# Patient Record
Sex: MALE | Race: WHITE | NOT HISPANIC OR LATINO | Employment: FULL TIME | URBAN - METROPOLITAN AREA
[De-identification: names, ages, dates, MRNs, and addresses within clinical notes are randomized per-mention and may not be internally consistent; named-entity substitution may affect disease eponyms.]

---

## 2020-12-23 ENCOUNTER — LAB (OUTPATIENT)
Dept: LAB | Facility: HOSPITAL | Age: 50
End: 2020-12-23

## 2020-12-23 ENCOUNTER — OFFICE VISIT (OUTPATIENT)
Dept: ENDOCRINOLOGY | Facility: CLINIC | Age: 50
End: 2020-12-23

## 2020-12-23 VITALS
TEMPERATURE: 97.7 F | WEIGHT: 180.6 LBS | OXYGEN SATURATION: 98 % | BODY MASS INDEX: 24.46 KG/M2 | DIASTOLIC BLOOD PRESSURE: 80 MMHG | SYSTOLIC BLOOD PRESSURE: 118 MMHG | HEART RATE: 61 BPM | HEIGHT: 72 IN

## 2020-12-23 DIAGNOSIS — E03.9 ACQUIRED HYPOTHYROIDISM: ICD-10-CM

## 2020-12-23 DIAGNOSIS — I10 BENIGN HYPERTENSION: ICD-10-CM

## 2020-12-23 DIAGNOSIS — E10.9 TYPE 1 DIABETES MELLITUS WITHOUT COMPLICATION (HCC): Primary | ICD-10-CM

## 2020-12-23 LAB
EXPIRATION DATE: NORMAL
HBA1C MFR BLD: 6 %
Lab: NORMAL
TSH SERPL DL<=0.05 MIU/L-ACNC: 10.7 UIU/ML (ref 0.27–4.2)

## 2020-12-23 PROCEDURE — 84443 ASSAY THYROID STIM HORMONE: CPT

## 2020-12-23 PROCEDURE — 99214 OFFICE O/P EST MOD 30 MIN: CPT | Performed by: INTERNAL MEDICINE

## 2020-12-23 PROCEDURE — 83036 HEMOGLOBIN GLYCOSYLATED A1C: CPT | Performed by: INTERNAL MEDICINE

## 2020-12-23 RX ORDER — ASPIRIN 81 MG/1
81 TABLET ORAL DAILY
COMMUNITY

## 2020-12-23 RX ORDER — LEVOTHYROXINE SODIUM 0.12 MG/1
125 TABLET ORAL DAILY
Qty: 90 TABLET | Refills: 3 | Status: SHIPPED | OUTPATIENT
Start: 2020-12-23 | End: 2020-12-24 | Stop reason: DRUGHIGH

## 2020-12-23 RX ORDER — INSULIN DEGLUDEC INJECTION 100 U/ML
13 INJECTION, SOLUTION SUBCUTANEOUS DAILY
Qty: 15 ML | Refills: 3 | Status: SHIPPED | OUTPATIENT
Start: 2020-12-23 | End: 2021-03-08

## 2020-12-23 RX ORDER — LEVOTHYROXINE SODIUM 0.12 MG/1
125 TABLET ORAL DAILY
COMMUNITY
End: 2020-12-23 | Stop reason: SDUPTHER

## 2020-12-23 RX ORDER — LISINOPRIL 10 MG/1
10 TABLET ORAL DAILY
COMMUNITY
End: 2020-12-23 | Stop reason: SDUPTHER

## 2020-12-23 RX ORDER — LISINOPRIL 10 MG/1
10 TABLET ORAL DAILY
Qty: 90 TABLET | Refills: 3 | Status: SHIPPED | OUTPATIENT
Start: 2020-12-23 | End: 2022-01-31 | Stop reason: SDUPTHER

## 2020-12-23 NOTE — PROGRESS NOTES
"     Office Note      Date: 2020  Patient Name: Nick Munguia  MRN: [id number]  : 1970    Chief Complaint   Patient presents with   • Diabetes       History of Present Illness:   Nick Munguia is a 50 y.o. male who presents for Diabetes type 1. Diagnosed in: . Treated in past with insulin. Current treatments: basal bolus insulin. Number of insulin shots per day: 4. Checks blood sugar 4 times a day. Has low blood sugar: rare. Aspirin use: Yes. Statin use: No -  . ACE-I/ARB use: Yes. Changes in health since last visit: none. Last eye exam 2019.    He remains on T4 125mcg qd. He is taking this correctly. He isn't taking any interfering  meds concurrently. He hasn't noted any change in the size of his neck. He denies any  compressive sxs. He denies any sxs of hypo- or hyperthyroidism at this time.    Subjective      Diabetic Complications:  Eyes: No  Kidneys: No  Feet: No  Heart: No    Diet and Exercise:  Meals per day: 3  Minutes of exercise per week: 200 mins.    Review of Systems:   Review of Systems   Constitutional: Negative.    Cardiovascular: Negative.    Gastrointestinal: Negative.    Endocrine: Negative.        The following portions of the patient's history were reviewed and updated as appropriate: allergies, current medications, past family history, past medical history, past social history, past surgical history and problem list.    Objective       Visit Vitals  /80   Pulse 61   Temp 97.7 °F (36.5 °C) (Infrared)   Ht 182.9 cm (72\")   Wt 81.9 kg (180 lb 9.6 oz)   SpO2 98%   BMI 24.49 kg/m²       Physical Exam:  Physical Exam  Constitutional:       Appearance: Normal appearance.   Neurological:      Mental Status: He is alert.         Labs:    HbA1c  Lab Results   Component Value Date    HGBA1C 6.0 2020       CMP  No results found for: GLUCOSE, BUN, CREATININE, EGFRIFNONA, EGFRIFAFRI, BCR, K, CO2, CALCIUM, PROTENTOTREF, LABIL2, BILIRUBIN, AST, ALT     Lipid Panel        TSH  No " results found for: TSH, FREET4     Hemoglobin A1C  Lab Results   Component Value Date    HGBA1C 6.0 12/23/2020        Microalbumin/Creatinine  No results found for: MALBCRERATIO, CREATINIURIN, MICROALBUR        Assessment / Plan      Assessment & Plan:  Problem List Items Addressed This Visit        Cardiovascular and Mediastinum    Benign hypertension    Relevant Medications    lisinopril (PRINIVIL,ZESTRIL) 10 MG tablet       Endocrine    Type 1 diabetes mellitus without complication (CMS/formerly [facility]) - Primary    Relevant Medications    insulin aspart (novoLOG FLEXPEN) 100 UNIT/ML solution pen-injector sc pen    insulin degludec (Tresiba FlexTouch) 100 UNIT/ML solution pen-injector injection    Other Relevant Orders    POC Glycosylated Hemoglobin (Hb A1C) (Completed)    Acquired hypothyroidism    Relevant Medications    levothyroxine (SYNTHROID, LEVOTHROID) 125 MCG tablet    Other Relevant Orders    TSH           Return in about 3 months (around 3/23/2021) for Recheck with A1c, CMP, lipids, TSH, microalbumin.    Albert Bell MD   12/23/2020

## 2020-12-24 RX ORDER — LEVOTHYROXINE SODIUM 0.15 MG/1
150 TABLET ORAL DAILY
Qty: 90 TABLET | Refills: 3 | Status: SHIPPED | OUTPATIENT
Start: 2020-12-24 | End: 2022-01-31 | Stop reason: SDUPTHER

## 2021-03-08 RX ORDER — INSULIN GLARGINE 100 [IU]/ML
13 INJECTION, SOLUTION SUBCUTANEOUS DAILY
Qty: 15 ML | Refills: 0 | Status: SHIPPED | OUTPATIENT
Start: 2021-03-08 | End: 2022-01-31 | Stop reason: SDUPTHER

## 2021-04-13 ENCOUNTER — LAB (OUTPATIENT)
Dept: LAB | Facility: HOSPITAL | Age: 51
End: 2021-04-13

## 2021-04-13 ENCOUNTER — OFFICE VISIT (OUTPATIENT)
Dept: ENDOCRINOLOGY | Facility: CLINIC | Age: 51
End: 2021-04-13

## 2021-04-13 VITALS
DIASTOLIC BLOOD PRESSURE: 62 MMHG | SYSTOLIC BLOOD PRESSURE: 102 MMHG | BODY MASS INDEX: 23.98 KG/M2 | OXYGEN SATURATION: 99 % | WEIGHT: 177 LBS | HEART RATE: 74 BPM | HEIGHT: 72 IN | TEMPERATURE: 97.3 F

## 2021-04-13 DIAGNOSIS — E10.9 TYPE 1 DIABETES MELLITUS WITHOUT COMPLICATION (HCC): Primary | ICD-10-CM

## 2021-04-13 DIAGNOSIS — E03.9 ACQUIRED HYPOTHYROIDISM: ICD-10-CM

## 2021-04-13 DIAGNOSIS — E10.9 TYPE 1 DIABETES MELLITUS WITHOUT COMPLICATION (HCC): ICD-10-CM

## 2021-04-13 DIAGNOSIS — I10 BENIGN HYPERTENSION: ICD-10-CM

## 2021-04-13 LAB
ALBUMIN SERPL-MCNC: 4.2 G/DL (ref 3.5–5.2)
ALBUMIN UR-MCNC: <1.2 MG/DL
ALBUMIN/GLOB SERPL: 1.4 G/DL
ALP SERPL-CCNC: 68 U/L (ref 39–117)
ALT SERPL W P-5'-P-CCNC: 16 U/L (ref 1–41)
ANION GAP SERPL CALCULATED.3IONS-SCNC: 8.1 MMOL/L (ref 5–15)
AST SERPL-CCNC: 21 U/L (ref 1–40)
BILIRUB SERPL-MCNC: 0.6 MG/DL (ref 0–1.2)
BUN SERPL-MCNC: 15 MG/DL (ref 6–20)
BUN/CREAT SERPL: 14.7 (ref 7–25)
CALCIUM SPEC-SCNC: 9.3 MG/DL (ref 8.6–10.5)
CHLORIDE SERPL-SCNC: 100 MMOL/L (ref 98–107)
CHOLEST SERPL-MCNC: 117 MG/DL (ref 0–200)
CO2 SERPL-SCNC: 29.9 MMOL/L (ref 22–29)
CREAT SERPL-MCNC: 1.02 MG/DL (ref 0.76–1.27)
CREAT UR-MCNC: 104.2 MG/DL
EXPIRATION DATE: NORMAL
GFR SERPL CREATININE-BSD FRML MDRD: 78 ML/MIN/1.73
GLOBULIN UR ELPH-MCNC: 3.1 GM/DL
GLUCOSE SERPL-MCNC: 84 MG/DL (ref 65–99)
HBA1C MFR BLD: 5.9 %
HDLC SERPL-MCNC: 53 MG/DL (ref 40–60)
LDLC SERPL CALC-MCNC: 53 MG/DL (ref 0–100)
LDLC/HDLC SERPL: 1.03 {RATIO}
Lab: NORMAL
MICROALBUMIN/CREAT UR: NORMAL MG/G{CREAT}
POTASSIUM SERPL-SCNC: 4.3 MMOL/L (ref 3.5–5.2)
PROT SERPL-MCNC: 7.3 G/DL (ref 6–8.5)
SODIUM SERPL-SCNC: 138 MMOL/L (ref 136–145)
TRIGL SERPL-MCNC: 46 MG/DL (ref 0–150)
TSH SERPL DL<=0.05 MIU/L-ACNC: 1.23 UIU/ML (ref 0.27–4.2)
VLDLC SERPL-MCNC: 11 MG/DL (ref 5–40)

## 2021-04-13 PROCEDURE — 83036 HEMOGLOBIN GLYCOSYLATED A1C: CPT | Performed by: INTERNAL MEDICINE

## 2021-04-13 PROCEDURE — 84443 ASSAY THYROID STIM HORMONE: CPT

## 2021-04-13 PROCEDURE — 80053 COMPREHEN METABOLIC PANEL: CPT

## 2021-04-13 PROCEDURE — 80061 LIPID PANEL: CPT

## 2021-04-13 PROCEDURE — 82570 ASSAY OF URINE CREATININE: CPT

## 2021-04-13 PROCEDURE — 82043 UR ALBUMIN QUANTITATIVE: CPT

## 2021-04-13 PROCEDURE — 99214 OFFICE O/P EST MOD 30 MIN: CPT | Performed by: INTERNAL MEDICINE

## 2021-04-13 RX ORDER — BLOOD SUGAR DIAGNOSTIC
STRIP MISCELLANEOUS
COMMUNITY
Start: 2021-03-06 | End: 2021-11-22 | Stop reason: SDUPTHER

## 2021-04-13 RX ORDER — PEN NEEDLE, DIABETIC 31 GX5/16"
NEEDLE, DISPOSABLE MISCELLANEOUS
COMMUNITY
Start: 2021-03-06 | End: 2022-01-31 | Stop reason: SDUPTHER

## 2021-04-13 NOTE — PROGRESS NOTES
"     Office Note      Date: 2021  Patient Name: Nick Munguia  MRN: [id number]  : 1970    Chief Complaint   Patient presents with   • Diabetes       History of Present Illness:   Nick Munguia is a 50 y.o. male who presents for Diabetes type 1. Diagnosed in: . Treated in past with insulin. Current treatments: basal bolus insulin. Number of insulin shots per day: 4. Checks blood sugar 4 times a day. Has low blood sugar: rare. Aspirin use: Yes. Statin use: No -  . ACE-I/ARB use: Yes. Changes in health since last visit: none. Last eye exam 2019.     He remains on T4 150mcg qd. He is taking this correctly. He isn't taking any interfering meds concurrently. He hasn't noted any change in the size of his neck. He denies any compressive sxs. He denies any sxs of hypo- or hyperthyroidism at this time.    He had both COVID-19 vaccinations.  Had a reaction after 2nd shot.    Subjective      Diabetic Complications:  Eyes: No  Kidneys: No  Feet: No  Heart: No    Diet and Exercise:  Meals per day: 3  Minutes of exercise per week: 200 mins.    Review of Systems:   Review of Systems   Constitutional: Negative.    Cardiovascular: Negative.    Gastrointestinal: Negative.    Endocrine: Negative.        The following portions of the patient's history were reviewed and updated as appropriate: allergies, current medications, past family history, past medical history, past social history, past surgical history and problem list.    Objective       Visit Vitals  /62   Pulse 74   Temp 97.3 °F (36.3 °C) (Infrared)   Ht 182.9 cm (72\")   Wt 80.3 kg (177 lb)   SpO2 99%   BMI 24.01 kg/m²       Physical Exam:  Physical Exam  Constitutional:       Appearance: Normal appearance.   Neck:      Thyroid: No thyroid mass, thyromegaly or thyroid tenderness.   Cardiovascular:      Pulses:           Dorsalis pedis pulses are 2+ on the right side and 2+ on the left side.        Posterior tibial pulses are 2+ on the right side and 2+ on " the left side.   Feet:      Right foot:      Protective Sensation: 5 sites tested. 5 sites sensed.      Skin integrity: Skin integrity normal.      Toenail Condition: Right toenails are abnormally thick.      Left foot:      Protective Sensation: 5 sites tested. 5 sites sensed.      Skin integrity: Callus present.      Toenail Condition: Left toenails are abnormally thick.      Comments: Callus on medial aspect of 1st toe  Lymphadenopathy:      Cervical: No cervical adenopathy.   Neurological:      Mental Status: He is alert.         Labs:    HbA1c  Lab Results   Component Value Date    HGBA1C 5.9 04/13/2021       CMP  No results found for: GLUCOSE, BUN, CREATININE, EGFRIFNONA, EGFRIFAFRI, BCR, K, CO2, CALCIUM, PROTENTOTREF, LABIL2, BILIRUBIN, AST, ALT     Lipid Panel        TSH  Lab Results   Component Value Date    TSH 10.700 (H) 12/23/2020        Hemoglobin A1C  Lab Results   Component Value Date    HGBA1C 5.9 04/13/2021        Microalbumin/Creatinine  No results found for: MALBCRERATIO, CREATINIURIN, MICROALBUR        Assessment / Plan      Assessment & Plan:  Diagnoses and all orders for this visit:    1. Type 1 diabetes mellitus without complication (CMS/HCC) (Primary)  Assessment & Plan:  Diabetes is unchanged.   Continue current treatment regimen.  Diabetes will be reassessed in 3 months.    Orders:  -     POC Glycosylated Hemoglobin (Hb A1C)  -     Comprehensive Metabolic Panel; Future  -     Lipid Panel; Future  -     Microalbumin / Creatinine Urine Ratio - Urine, Clean Catch; Future  -     TSH; Future    2. Benign hypertension  Assessment & Plan:  Hypertension is unchanged.  Continue current treatment regimen.  Blood pressure will be reassessed at the next regular appointment.      3. Acquired hypothyroidism  Assessment & Plan:  Continue T4.  Check TSH today.        Return in about 3 months (around 7/13/2021) for Recheck with A1c.    Albert Bell MD   04/13/2021

## 2021-08-17 ENCOUNTER — OFFICE VISIT (OUTPATIENT)
Dept: ENDOCRINOLOGY | Facility: CLINIC | Age: 51
End: 2021-08-17

## 2021-08-17 VITALS
DIASTOLIC BLOOD PRESSURE: 68 MMHG | WEIGHT: 168 LBS | SYSTOLIC BLOOD PRESSURE: 110 MMHG | HEART RATE: 56 BPM | OXYGEN SATURATION: 97 % | BODY MASS INDEX: 22.75 KG/M2 | HEIGHT: 72 IN

## 2021-08-17 DIAGNOSIS — E10.9 TYPE 1 DIABETES MELLITUS WITHOUT COMPLICATION (HCC): Primary | ICD-10-CM

## 2021-08-17 DIAGNOSIS — I10 BENIGN HYPERTENSION: ICD-10-CM

## 2021-08-17 DIAGNOSIS — E03.9 ACQUIRED HYPOTHYROIDISM: ICD-10-CM

## 2021-08-17 LAB
EXPIRATION DATE: NORMAL
EXPIRATION DATE: NORMAL
GLUCOSE BLDC GLUCOMTR-MCNC: 79 MG/DL (ref 70–130)
HBA1C MFR BLD: 5.7 %
Lab: NORMAL
Lab: NORMAL

## 2021-08-17 PROCEDURE — 83036 HEMOGLOBIN GLYCOSYLATED A1C: CPT | Performed by: INTERNAL MEDICINE

## 2021-08-17 PROCEDURE — 99214 OFFICE O/P EST MOD 30 MIN: CPT | Performed by: INTERNAL MEDICINE

## 2021-08-17 PROCEDURE — 82947 ASSAY GLUCOSE BLOOD QUANT: CPT | Performed by: INTERNAL MEDICINE

## 2021-08-17 NOTE — PROGRESS NOTES
"     Office Note      Date: 2021  Patient Name: Nick Munguia  MRN: [id number]  : 1970    Chief Complaint   Patient presents with   • Diabetes       History of Present Illness:   Nick Munguia is a 50 y.o. male who presents for Diabetes type 1. Diagnosed in: . Treated in past with insulin. Current treatments: basal bolus insulin. Number of insulin shots per day: 4. Checks blood sugar 4 times a day. Has low blood sugar: rare. Aspirin use: Yes. Statin use: No -  . ACE-I/ARB use: Yes. Changes in health since last visit: none. Last eye exam 2019.     He remains on T4 150mcg qd. He is taking this correctly. He isn't taking any interfering meds concurrently. He hasn't noted any change in the size of his neck. He denies any compressive sxs. He denies any sxs of hypo- or hyperthyroidism at this time.    Subjective      Diabetic Complications:  Eyes: No  Kidneys: No  Feet: No  Heart: No    Diet and Exercise:  Meals per day: 3  Minutes of exercise per week: 200 mins.    Review of Systems:   Review of Systems   Constitutional: Negative.    Cardiovascular: Negative.    Gastrointestinal: Negative.    Endocrine: Negative.        The following portions of the patient's history were reviewed and updated as appropriate: allergies, current medications, past family history, past medical history, past social history, past surgical history and problem list.    Objective       Visit Vitals  /68 (BP Location: Left arm, Patient Position: Sitting, Cuff Size: Adult)   Pulse 56   Ht 182.9 cm (72\")   Wt 76.2 kg (168 lb)   SpO2 97%   BMI 22.78 kg/m²       Physical Exam:  Physical Exam  Constitutional:       Appearance: Normal appearance.   Neurological:      Mental Status: He is alert.         Labs:    HbA1c  Lab Results   Component Value Date    HGBA1C 5.7 2021       CMP  Lab Results   Component Value Date    GLUCOSE 84 2021    BUN 15 2021    CREATININE 1.02 2021    EGFRIFNONA 78 2021    BCR " 14.7 04/13/2021    K 4.3 04/13/2021    CO2 29.9 (H) 04/13/2021    CALCIUM 9.3 04/13/2021    AST 21 04/13/2021    ALT 16 04/13/2021        Lipid Panel  Lab Results   Component Value Date    HDL 53 04/13/2021    LDL 53 04/13/2021    TRIG 46 04/13/2021        TSH  Lab Results   Component Value Date    TSH 1.230 04/13/2021        Hemoglobin A1C  Lab Results   Component Value Date    HGBA1C 5.7 08/17/2021        Microalbumin/Creatinine  Lab Results   Component Value Date    MALBCRERATIO  04/13/2021      Comment:      Unable to calculate    MICROALBUR <1.2 04/13/2021           Assessment / Plan      Assessment & Plan:  Diagnoses and all orders for this visit:    1. Type 1 diabetes mellitus without complication (CMS/HCC) (Primary)  Assessment & Plan:  Diabetes is unchanged.   Continue current treatment regimen.  Diabetes will be reassessed in 3 months.    Orders:  -     POC Glycosylated Hemoglobin (Hb A1C)  -     POC Glucose, Blood    2. Benign hypertension  Assessment & Plan:  Hypertension is unchanged.  Continue current treatment regimen.  Blood pressure will be reassessed at the next regular appointment.      3. Acquired hypothyroidism  Assessment & Plan:  TSH at goal last visit.  Continue current T4 dose.  Plan to check TSH next visit.        Return in about 3 months (around 11/17/2021) for Recheck with A1c, TSH.    Albert Bell MD   08/17/2021

## 2021-11-22 RX ORDER — BLOOD SUGAR DIAGNOSTIC
STRIP MISCELLANEOUS
Qty: 150 EACH | Refills: 5 | Status: SHIPPED | OUTPATIENT
Start: 2021-11-22 | End: 2022-06-01 | Stop reason: SDUPTHER

## 2021-12-01 ENCOUNTER — LAB (OUTPATIENT)
Dept: LAB | Facility: HOSPITAL | Age: 51
End: 2021-12-01

## 2021-12-01 ENCOUNTER — OFFICE VISIT (OUTPATIENT)
Dept: ENDOCRINOLOGY | Facility: CLINIC | Age: 51
End: 2021-12-01

## 2021-12-01 VITALS
DIASTOLIC BLOOD PRESSURE: 59 MMHG | BODY MASS INDEX: 22.89 KG/M2 | WEIGHT: 169 LBS | HEART RATE: 70 BPM | OXYGEN SATURATION: 97 % | HEIGHT: 72 IN | SYSTOLIC BLOOD PRESSURE: 102 MMHG

## 2021-12-01 DIAGNOSIS — E03.9 ACQUIRED HYPOTHYROIDISM: ICD-10-CM

## 2021-12-01 DIAGNOSIS — E10.9 TYPE 1 DIABETES MELLITUS WITHOUT COMPLICATION (HCC): Primary | ICD-10-CM

## 2021-12-01 DIAGNOSIS — I10 BENIGN HYPERTENSION: ICD-10-CM

## 2021-12-01 LAB
EXPIRATION DATE: NORMAL
EXPIRATION DATE: NORMAL
GLUCOSE BLDC GLUCOMTR-MCNC: 81 MG/DL (ref 70–130)
HBA1C MFR BLD: 7 %
Lab: NORMAL
Lab: NORMAL
TSH SERPL DL<=0.05 MIU/L-ACNC: 2.62 UIU/ML (ref 0.27–4.2)

## 2021-12-01 PROCEDURE — 2033F EYE IMG VALID W/O RTNOPTHY: CPT | Performed by: INTERNAL MEDICINE

## 2021-12-01 PROCEDURE — 84443 ASSAY THYROID STIM HORMONE: CPT

## 2021-12-01 PROCEDURE — 92250 FUNDUS PHOTOGRAPHY W/I&R: CPT | Performed by: INTERNAL MEDICINE

## 2021-12-01 PROCEDURE — 83036 HEMOGLOBIN GLYCOSYLATED A1C: CPT | Performed by: INTERNAL MEDICINE

## 2021-12-01 PROCEDURE — 82947 ASSAY GLUCOSE BLOOD QUANT: CPT | Performed by: INTERNAL MEDICINE

## 2021-12-01 PROCEDURE — 99214 OFFICE O/P EST MOD 30 MIN: CPT | Performed by: INTERNAL MEDICINE

## 2021-12-01 NOTE — PROGRESS NOTES
"     Office Note      Date: 2021  Patient Name: Nick Munguia  MRN: [id number]  : 1970    Chief Complaint   Patient presents with   • Diabetes     type 1       History of Present Illness:   Nick Munguia is a 51 y.o. male who presents for Diabetes type 1. Diagnosed in: . Treated in past with insulin. Current treatments: basal bolus insulin. Number of insulin shots per day: 4. Checks blood sugar 4 times a day. Has low blood sugar: rare. Aspirin use: Yes. Statin use: No -  . ACE-I/ARB use: Yes. Changes in health since last visit: COVID booster injection - had higher FSBS since then. Last eye exam .     He remains on T4 150mcg qd. He is taking this correctly. He isn't taking any interfering meds concurrently. He hasn't noted any change in the size of his neck. He denies any compressive sxs. He denies any sxs of hypo- or hyperthyroidism at this time.    Subjective      Diabetic Complications:  Eyes: No  Kidneys: No  Feet: No  Heart: No    Diet and Exercise:  Meals per day: 3  Minutes of exercise per week: 200 mins.    Review of Systems:   Review of Systems   Constitutional: Negative.    Cardiovascular: Negative.    Gastrointestinal: Negative.    Endocrine: Negative.        The following portions of the patient's history were reviewed and updated as appropriate: allergies, current medications, past family history, past medical history, past social history, past surgical history and problem list.    Objective       Visit Vitals  /59   Pulse 70   Ht 182.9 cm (72\")   Wt 76.7 kg (169 lb)   SpO2 97%   BMI 22.92 kg/m²       Physical Exam:  Physical Exam  Constitutional:       Appearance: Normal appearance.   Neurological:      Mental Status: He is alert.         Labs:    HbA1c  Lab Results   Component Value Date    HGBA1C 7.0 2021       CMP  Lab Results   Component Value Date    GLUCOSE 84 2021    BUN 15 2021    CREATININE 1.02 2021    EGFRIFNONA 78 2021    BCR 14.7 " 04/13/2021    K 4.3 04/13/2021    CO2 29.9 (H) 04/13/2021    CALCIUM 9.3 04/13/2021    AST 21 04/13/2021    ALT 16 04/13/2021        Lipid Panel  Lab Results   Component Value Date    HDL 53 04/13/2021    LDL 53 04/13/2021    TRIG 46 04/13/2021        TSH  Lab Results   Component Value Date    TSH 2.620 12/01/2021        Hemoglobin A1C  Lab Results   Component Value Date    HGBA1C 7.0 12/01/2021        Microalbumin/Creatinine  Lab Results   Component Value Date    MALBCRERATIO  04/13/2021      Comment:      Unable to calculate    MICROALBUR <1.2 04/13/2021           Assessment / Plan      Assessment & Plan:  Diagnoses and all orders for this visit:    1. Type 1 diabetes mellitus without complication (HCC) (Primary)  Assessment & Plan:  Diabetes is worsening.  A1c increased but still acceptable. He has noted higher glucose since COVID booster.  Continue current treatment regimen.  But titrate up insulin.  Diabetes will be reassessed in 3 months.    Retinal photos were performed today.  This revealed no retinopathy in the left eye.  Pt is monocular so no image for the right eye.    Orders:  -     POC Glycosylated Hemoglobin (Hb A1C)  -     POC Glucose, Blood    2. Benign hypertension  Assessment & Plan:  Hypertension is unchanged.  Continue current treatment regimen.  Blood pressure will be reassessed at the next regular appointment.      3. Acquired hypothyroidism  Assessment & Plan:  Continue T4.  Check TSH today.    Orders:  -     TSH; Future      Return in about 3 months (around 3/1/2022) for Recheck with A1c, CMP, lipids, TSH, microalbumin, foot exam.    Albert Bell MD   12/01/2021

## 2021-12-07 ENCOUNTER — TELEPHONE (OUTPATIENT)
Dept: ENDOCRINOLOGY | Facility: CLINIC | Age: 51
End: 2021-12-07

## 2022-01-31 RX ORDER — PEN NEEDLE, DIABETIC 31 GX5/16"
NEEDLE, DISPOSABLE MISCELLANEOUS
Qty: 150 EACH | Refills: 5 | Status: SHIPPED | OUTPATIENT
Start: 2022-01-31

## 2022-01-31 RX ORDER — LISINOPRIL 10 MG/1
10 TABLET ORAL DAILY
Qty: 90 TABLET | Refills: 1 | Status: SHIPPED | OUTPATIENT
Start: 2022-01-31 | End: 2022-08-05 | Stop reason: SDUPTHER

## 2022-01-31 RX ORDER — INSULIN GLARGINE 100 [IU]/ML
13 INJECTION, SOLUTION SUBCUTANEOUS DAILY
Qty: 15 ML | Refills: 1 | Status: SHIPPED | OUTPATIENT
Start: 2022-01-31 | End: 2022-08-05 | Stop reason: SDUPTHER

## 2022-01-31 RX ORDER — LEVOTHYROXINE SODIUM 0.15 MG/1
150 TABLET ORAL DAILY
Qty: 90 TABLET | Refills: 1 | Status: SHIPPED | OUTPATIENT
Start: 2022-01-31 | End: 2022-08-05 | Stop reason: SDUPTHER

## 2022-03-07 ENCOUNTER — LAB (OUTPATIENT)
Dept: LAB | Facility: HOSPITAL | Age: 52
End: 2022-03-07

## 2022-03-07 ENCOUNTER — OFFICE VISIT (OUTPATIENT)
Dept: ENDOCRINOLOGY | Facility: CLINIC | Age: 52
End: 2022-03-07

## 2022-03-07 VITALS
OXYGEN SATURATION: 97 % | SYSTOLIC BLOOD PRESSURE: 106 MMHG | HEART RATE: 70 BPM | WEIGHT: 175 LBS | BODY MASS INDEX: 23.7 KG/M2 | DIASTOLIC BLOOD PRESSURE: 60 MMHG | HEIGHT: 72 IN

## 2022-03-07 DIAGNOSIS — E10.9 TYPE 1 DIABETES MELLITUS WITHOUT COMPLICATION: Primary | ICD-10-CM

## 2022-03-07 DIAGNOSIS — E10.9 TYPE 1 DIABETES MELLITUS WITHOUT COMPLICATION: ICD-10-CM

## 2022-03-07 DIAGNOSIS — I10 BENIGN HYPERTENSION: ICD-10-CM

## 2022-03-07 DIAGNOSIS — E03.9 ACQUIRED HYPOTHYROIDISM: ICD-10-CM

## 2022-03-07 LAB
ALBUMIN SERPL-MCNC: 4.3 G/DL (ref 3.5–5.2)
ALBUMIN UR-MCNC: <1.2 MG/DL
ALBUMIN/GLOB SERPL: 1.8 G/DL
ALP SERPL-CCNC: 71 U/L (ref 39–117)
ALT SERPL W P-5'-P-CCNC: 16 U/L (ref 1–41)
ANION GAP SERPL CALCULATED.3IONS-SCNC: 6.6 MMOL/L (ref 5–15)
AST SERPL-CCNC: 17 U/L (ref 1–40)
BILIRUB SERPL-MCNC: 0.5 MG/DL (ref 0–1.2)
BUN SERPL-MCNC: 14 MG/DL (ref 6–20)
BUN/CREAT SERPL: 13.9 (ref 7–25)
CALCIUM SPEC-SCNC: 9.2 MG/DL (ref 8.6–10.5)
CHLORIDE SERPL-SCNC: 103 MMOL/L (ref 98–107)
CHOLEST SERPL-MCNC: 120 MG/DL (ref 0–200)
CO2 SERPL-SCNC: 29.4 MMOL/L (ref 22–29)
CREAT SERPL-MCNC: 1.01 MG/DL (ref 0.76–1.27)
CREAT UR-MCNC: 118.6 MG/DL
EGFRCR SERPLBLD CKD-EPI 2021: 90.6 ML/MIN/1.73
EXPIRATION DATE: ABNORMAL
EXPIRATION DATE: NORMAL
GLOBULIN UR ELPH-MCNC: 2.4 GM/DL
GLUCOSE BLDC GLUCOMTR-MCNC: 160 MG/DL (ref 70–130)
GLUCOSE SERPL-MCNC: 142 MG/DL (ref 65–99)
HBA1C MFR BLD: 6.4 %
HDLC SERPL-MCNC: 50 MG/DL (ref 40–60)
LDLC SERPL CALC-MCNC: 61 MG/DL (ref 0–100)
LDLC/HDLC SERPL: 1.27 {RATIO}
Lab: ABNORMAL
Lab: NORMAL
MICROALBUMIN/CREAT UR: NORMAL MG/G{CREAT}
POTASSIUM SERPL-SCNC: 4.4 MMOL/L (ref 3.5–5.2)
PROT SERPL-MCNC: 6.7 G/DL (ref 6–8.5)
SODIUM SERPL-SCNC: 139 MMOL/L (ref 136–145)
TRIGL SERPL-MCNC: 32 MG/DL (ref 0–150)
TSH SERPL DL<=0.05 MIU/L-ACNC: 0.72 UIU/ML (ref 0.27–4.2)
VLDLC SERPL-MCNC: 9 MG/DL (ref 5–40)

## 2022-03-07 PROCEDURE — 84443 ASSAY THYROID STIM HORMONE: CPT

## 2022-03-07 PROCEDURE — 82947 ASSAY GLUCOSE BLOOD QUANT: CPT | Performed by: INTERNAL MEDICINE

## 2022-03-07 PROCEDURE — 99214 OFFICE O/P EST MOD 30 MIN: CPT | Performed by: INTERNAL MEDICINE

## 2022-03-07 PROCEDURE — 82570 ASSAY OF URINE CREATININE: CPT

## 2022-03-07 PROCEDURE — 82043 UR ALBUMIN QUANTITATIVE: CPT

## 2022-03-07 PROCEDURE — 80061 LIPID PANEL: CPT

## 2022-03-07 PROCEDURE — 83036 HEMOGLOBIN GLYCOSYLATED A1C: CPT | Performed by: INTERNAL MEDICINE

## 2022-03-07 PROCEDURE — 80053 COMPREHEN METABOLIC PANEL: CPT

## 2022-03-07 NOTE — PROGRESS NOTES
"     Office Note      Date: 2022  Patient Name: Nick Munguia  MRN: [id number]  : 1970    Chief Complaint   Patient presents with   • Diabetes     Type I       History of Present Illness:   Nick Munguia is a 51 y.o. male who presents for Diabetes type 1. Diagnosed in: . Treated in past with insulin. Current treatments: basal bolus insulin. Number of insulin shots per day: 4. Checks blood sugar 4 times a day. Has low blood sugar: rare. Aspirin use: Yes. Statin use: No -  . ACE-I/ARB use: Yes. Changes in health since last visit: none. Last eye exam 2021.     He remains on T4 150mcg qd. He is taking this correctly. He isn't taking any interfering meds concurrently. He hasn't noted any change in the size of his neck. He denies any compressive sxs. He denies any sxs of hypo- or hyperthyroidism at this time.    Subjective      Diabetic Complications:  Eyes: No  Kidneys: No  Feet: No  Heart: No    Diet and Exercise:  Meals per day: 3  Minutes of exercise per week: 20 mins.    Review of Systems:   Review of Systems   Constitutional: Negative.    Cardiovascular: Negative.    Gastrointestinal: Negative.    Endocrine: Negative.        The following portions of the patient's history were reviewed and updated as appropriate: allergies, current medications, past family history, past medical history, past social history, past surgical history and problem list.    Objective       Visit Vitals  /60 (BP Location: Left arm, Patient Position: Sitting, Cuff Size: Adult)   Pulse 70   Ht 182.9 cm (72\")   Wt 79.4 kg (175 lb)   SpO2 97%   BMI 23.73 kg/m²       Physical Exam:  Physical Exam  Constitutional:       Appearance: Normal appearance.   Neck:      Thyroid: No thyroid mass, thyromegaly or thyroid tenderness.   Cardiovascular:      Pulses:           Dorsalis pedis pulses are 2+ on the right side and 2+ on the left side.        Posterior tibial pulses are 2+ on the right side and 2+ on the left side.   Feet: "      Right foot:      Protective Sensation: 5 sites tested. 5 sites sensed.      Skin integrity: Skin integrity normal.      Toenail Condition: Right toenails are normal.      Left foot:      Protective Sensation: 5 sites tested. 5 sites sensed.      Skin integrity: Skin integrity normal.      Toenail Condition: Left toenails are normal.   Lymphadenopathy:      Cervical: No cervical adenopathy.   Neurological:      Mental Status: He is alert.         Labs:    HbA1c  Lab Results   Component Value Date    HGBA1C 6.4 03/07/2022       CMP  Lab Results   Component Value Date    GLUCOSE 84 04/13/2021    BUN 15 04/13/2021    CREATININE 1.02 04/13/2021    EGFRIFNONA 78 04/13/2021    BCR 14.7 04/13/2021    K 4.3 04/13/2021    CO2 29.9 (H) 04/13/2021    CALCIUM 9.3 04/13/2021    AST 21 04/13/2021    ALT 16 04/13/2021        Lipid Panel  Lab Results   Component Value Date    HDL 53 04/13/2021    LDL 53 04/13/2021    TRIG 46 04/13/2021        TSH  Lab Results   Component Value Date    TSH 2.620 12/01/2021        Hemoglobin A1C  Lab Results   Component Value Date    HGBA1C 6.4 03/07/2022        Microalbumin/Creatinine  Lab Results   Component Value Date    MALBCRERATIO  04/13/2021      Comment:      Unable to calculate    MICROALBUR <1.2 04/13/2021           Assessment / Plan      Assessment & Plan:  Diagnoses and all orders for this visit:    1. Type 1 diabetes mellitus without complication (HCC) (Primary)  Assessment & Plan:  Diabetes is improving with treatment.   Continue current treatment regimen.  Diabetes will be reassessed in 3 months.    Orders:  -     POC Glucose, Blood  -     POC Glycosylated Hemoglobin (Hb A1C)  -     Comprehensive Metabolic Panel; Future  -     Lipid Panel; Future  -     Microalbumin / Creatinine Urine Ratio - Urine, Clean Catch; Future    2. Benign hypertension  Assessment & Plan:  Hypertension is unchanged.  Continue current treatment regimen.  Blood pressure will be reassessed at the next regular  appointment.      3. Acquired hypothyroidism  Assessment & Plan:  Continue T4 tx.  Check TSH today.    Orders:  -     TSH; Future      Return in about 3 months (around 6/7/2022) for Recheck with A1c, TSH.    Albert Bell MD   03/07/2022

## 2022-06-01 RX ORDER — BLOOD SUGAR DIAGNOSTIC
STRIP MISCELLANEOUS
Qty: 150 EACH | Refills: 11 | Status: SHIPPED | OUTPATIENT
Start: 2022-06-01 | End: 2023-03-14 | Stop reason: SDUPTHER

## 2022-08-03 ENCOUNTER — OFFICE VISIT (OUTPATIENT)
Dept: ENDOCRINOLOGY | Facility: CLINIC | Age: 52
End: 2022-08-03

## 2022-08-03 VITALS
OXYGEN SATURATION: 99 % | HEART RATE: 73 BPM | DIASTOLIC BLOOD PRESSURE: 68 MMHG | SYSTOLIC BLOOD PRESSURE: 104 MMHG | BODY MASS INDEX: 23.08 KG/M2 | HEIGHT: 72 IN | WEIGHT: 170.4 LBS

## 2022-08-03 DIAGNOSIS — E10.9 TYPE 1 DIABETES MELLITUS WITHOUT COMPLICATION: Primary | ICD-10-CM

## 2022-08-03 DIAGNOSIS — I10 BENIGN HYPERTENSION: ICD-10-CM

## 2022-08-03 DIAGNOSIS — E03.9 ACQUIRED HYPOTHYROIDISM: ICD-10-CM

## 2022-08-03 LAB
EXPIRATION DATE: NORMAL
EXPIRATION DATE: NORMAL
GLUCOSE BLDC GLUCOMTR-MCNC: 76 MG/DL (ref 70–130)
HBA1C MFR BLD: 5.9 %
Lab: NORMAL
Lab: NORMAL

## 2022-08-03 PROCEDURE — 99214 OFFICE O/P EST MOD 30 MIN: CPT | Performed by: INTERNAL MEDICINE

## 2022-08-03 PROCEDURE — 82947 ASSAY GLUCOSE BLOOD QUANT: CPT | Performed by: INTERNAL MEDICINE

## 2022-08-03 PROCEDURE — 83036 HEMOGLOBIN GLYCOSYLATED A1C: CPT | Performed by: INTERNAL MEDICINE

## 2022-08-03 NOTE — PROGRESS NOTES
"     Office Note      Date: 2022  Patient Name: Nick Munguia  MRN: [id number]  : 1970    Chief Complaint   Patient presents with   • Diabetes       History of Present Illness:   Nick Munguia is a 51 y.o. male who presents for Diabetes type 1. Diagnosed in: . Treated in past with insulin. Current treatments: basal bolus insulin. Number of insulin shots per day: 4. Checks blood sugar 4 times a day. Has low blood sugar: rare. Aspirin use: Yes. Statin use: No -  . ACE-I/ARB use: Yes. Changes in health since last visit: none. Last eye exam 2021.     He remains on T4 150mcg qd. He is taking this correctly. He isn't taking any interfering meds concurrently. He hasn't noted any change in the size of his neck. He denies any compressive sxs. He denies any sxs of hypo- or hyperthyroidism at this time.    Subjective      Diabetic Complications:  Eyes: No  Kidneys: No  Feet: No  Heart: No    Diet and Exercise:  Meals per day: 3  Minutes of exercise per week: 250 mins.    Review of Systems:   Review of Systems   Constitutional: Negative.    Cardiovascular: Negative.    Gastrointestinal: Negative.    Endocrine: Negative.        The following portions of the patient's history were reviewed and updated as appropriate: allergies, current medications, past family history, past medical history, past social history, past surgical history and problem list.    Objective       Visit Vitals  /68   Pulse 73   Ht 182.9 cm (72\")   Wt 77.3 kg (170 lb 6.4 oz)   SpO2 99%   BMI 23.11 kg/m²       Physical Exam:  Physical Exam  Constitutional:       Appearance: Normal appearance.   Neurological:      Mental Status: He is alert.         Labs:    HbA1c  Lab Results   Component Value Date    HGBA1C 5.9 2022       CMP  Lab Results   Component Value Date    GLUCOSE 142 (H) 2022    BUN 14 2022    CREATININE 1.01 2022    EGFRIFNONA 78 2021    BCR 13.9 2022    K 4.4 2022    CO2 29.4 (H) " 03/07/2022    CALCIUM 9.2 03/07/2022    AST 17 03/07/2022    ALT 16 03/07/2022        Lipid Panel  Lab Results   Component Value Date    HDL 50 03/07/2022    LDL 61 03/07/2022    TRIG 32 03/07/2022        TSH  Lab Results   Component Value Date    TSH 0.718 03/07/2022        Hemoglobin A1C  Lab Results   Component Value Date    HGBA1C 5.9 08/03/2022        Microalbumin/Creatinine  Lab Results   Component Value Date    MALBCRERATIO  03/07/2022      Comment:      Unable to calculate    MICROALBUR <1.2 03/07/2022           Assessment / Plan      Assessment & Plan:  Diagnoses and all orders for this visit:    1. Type 1 diabetes mellitus without complication (HCC) (Primary)  Assessment & Plan:  Diabetes is unchanged.  A1c looks good at 5.9%.  Continue current treatment regimen.  Diabetes will be reassessed in 3 months.    Orders:  -     POC Glucose, Blood  -     POC Glycosylated Hemoglobin (Hb A1C)    2. Benign hypertension  Assessment & Plan:  Hypertension is unchanged.  Continue current treatment regimen.  Blood pressure will be reassessed at the next regular appointment.      3. Acquired hypothyroidism  Assessment & Plan:  Continue T4 tx.  TSH at goal last visit.        Return in about 3 months (around 11/3/2022) for Recheck with TSH.    Albert Bell MD   08/03/2022

## 2022-08-03 NOTE — ASSESSMENT & PLAN NOTE
Diabetes is unchanged.  A1c looks good at 5.9%.  Continue current treatment regimen.  Diabetes will be reassessed in 3 months.

## 2022-08-05 RX ORDER — INSULIN GLARGINE 100 [IU]/ML
13 INJECTION, SOLUTION SUBCUTANEOUS DAILY
Qty: 15 ML | Refills: 1 | Status: SHIPPED | OUTPATIENT
Start: 2022-08-05 | End: 2023-03-14 | Stop reason: SDUPTHER

## 2022-08-05 RX ORDER — LISINOPRIL 10 MG/1
10 TABLET ORAL DAILY
Qty: 90 TABLET | Refills: 1 | Status: SHIPPED | OUTPATIENT
Start: 2022-08-05 | End: 2023-03-14 | Stop reason: SDUPTHER

## 2022-08-05 RX ORDER — LEVOTHYROXINE SODIUM 0.15 MG/1
150 TABLET ORAL DAILY
Qty: 90 TABLET | Refills: 1 | Status: SHIPPED | OUTPATIENT
Start: 2022-08-05 | End: 2023-03-14 | Stop reason: SDUPTHER

## 2022-12-23 ENCOUNTER — OFFICE VISIT (OUTPATIENT)
Dept: ENDOCRINOLOGY | Facility: CLINIC | Age: 52
End: 2022-12-23

## 2022-12-23 ENCOUNTER — LAB (OUTPATIENT)
Dept: LAB | Facility: HOSPITAL | Age: 52
End: 2022-12-23

## 2022-12-23 VITALS
OXYGEN SATURATION: 97 % | SYSTOLIC BLOOD PRESSURE: 104 MMHG | WEIGHT: 178.2 LBS | DIASTOLIC BLOOD PRESSURE: 68 MMHG | BODY MASS INDEX: 24.14 KG/M2 | HEART RATE: 78 BPM | HEIGHT: 72 IN

## 2022-12-23 DIAGNOSIS — E03.9 ACQUIRED HYPOTHYROIDISM: ICD-10-CM

## 2022-12-23 DIAGNOSIS — I10 BENIGN HYPERTENSION: ICD-10-CM

## 2022-12-23 DIAGNOSIS — E10.9 TYPE 1 DIABETES MELLITUS WITHOUT COMPLICATION: Primary | ICD-10-CM

## 2022-12-23 LAB
EXPIRATION DATE: NORMAL
EXPIRATION DATE: NORMAL
GLUCOSE BLDC GLUCOMTR-MCNC: 111 MG/DL (ref 70–130)
HBA1C MFR BLD: 6.5 %
Lab: NORMAL
Lab: NORMAL

## 2022-12-23 PROCEDURE — 82947 ASSAY GLUCOSE BLOOD QUANT: CPT | Performed by: INTERNAL MEDICINE

## 2022-12-23 PROCEDURE — 84443 ASSAY THYROID STIM HORMONE: CPT

## 2022-12-23 PROCEDURE — 99214 OFFICE O/P EST MOD 30 MIN: CPT | Performed by: INTERNAL MEDICINE

## 2022-12-23 PROCEDURE — 83036 HEMOGLOBIN GLYCOSYLATED A1C: CPT | Performed by: INTERNAL MEDICINE

## 2022-12-23 NOTE — PROGRESS NOTES
"     Office Note      Date: 2022  Patient Name: iNck Munguia  MRN: [id number]  : 1970    Chief Complaint   Patient presents with   • Diabetes       History of Present Illness:   Nick Munguia is a 52 y.o. male who presents for Diabetes type 1. Diagnosed in: . Treated in past with insulin. Current treatments: basal bolus insulin. Number of insulin shots per day: 4. Checks blood sugar 4 times a day. Has low blood sugar: rare. Aspirin use: Yes. Statin use: No -  . ACE-I/ARB use: Yes. Changes in health since last visit: none. Last eye exam 2021.     He remains on T4 150mcg qd. He is taking this correctly. He isn't taking any interfering meds concurrently. He hasn't noted any change in the size of his neck. He denies any compressive sxs. He denies any sxs of hypo- or hyperthyroidism at this time.    Subjective      Diabetic Complications:  Eyes: No  Kidneys: No  Feet: No  Heart: No    Diet and Exercise:  Meals per day: 3  Minutes of exercise per week: 250 mins.    Review of Systems:   Review of Systems   Constitutional: Negative.    Cardiovascular: Negative.    Gastrointestinal: Negative.    Endocrine: Negative.        The following portions of the patient's history were reviewed and updated as appropriate: allergies, current medications, past family history, past medical history, past social history, past surgical history and problem list.    Objective       Visit Vitals  /68   Pulse 78   Ht 182.9 cm (72\")   Wt 80.8 kg (178 lb 3.2 oz)   SpO2 97%   BMI 24.17 kg/m²       Physical Exam:  Physical Exam  Constitutional:       Appearance: Normal appearance.   Neurological:      Mental Status: He is alert.         Labs:    HbA1c  Lab Results   Component Value Date    HGBA1C 6.5 2022       CMP  Lab Results   Component Value Date    GLUCOSE 142 (H) 2022    BUN 14 2022    CREATININE 1.01 2022    EGFRIFNONA 78 2021    BCR 13.9 2022    K 4.4 2022    CO2 29.4 (H) " 03/07/2022    CALCIUM 9.2 03/07/2022    AST 17 03/07/2022    ALT 16 03/07/2022        Lipid Panel  Lab Results   Component Value Date    HDL 50 03/07/2022    LDL 61 03/07/2022    TRIG 32 03/07/2022        TSH  Lab Results   Component Value Date    TSH 0.718 03/07/2022        Hemoglobin A1C  Lab Results   Component Value Date    HGBA1C 6.5 12/23/2022        Microalbumin/Creatinine  Lab Results   Component Value Date    MALBCRERATIO  03/07/2022      Comment:      Unable to calculate    MICROALBUR <1.2 03/07/2022           Assessment / Plan      Assessment & Plan:  Diagnoses and all orders for this visit:    1. Type 1 diabetes mellitus without complication (HCC) (Primary)  Assessment & Plan:  Diabetes is worsening.  A1c increased but okay at 6.5%.  Titrate up basal insulin.  Diabetes will be reassessed in 3 months.    Orders:  -     POC Glucose, Blood  -     POC Glycosylated Hemoglobin (Hb A1C)    2. Benign hypertension  Assessment & Plan:  Hypertension is unchanged.  Continue current treatment regimen.  Blood pressure will be reassessed at the next regular appointment.      3. Acquired hypothyroidism  Assessment & Plan:  Continue T4.  Check TSH.    Orders:  -     TSH; Future    Current Outpatient Medications   Medication Instructions   • Accu-Chek Estefany Plus test strip Test 4 times daily   • aspirin 81 mg, Oral, Daily   • B-D ULTRAFINE III SHORT PEN 31G X 8 MM misc USE 4 TIMES DAILY WITH INJECTIONS   • BASAGLAR KWIKPEN 13 Units, Subcutaneous, Daily   • insulin aspart (novoLOG FLEXPEN) 100 UNIT/ML solution pen-injector sc pen 1 unit per 20g carboydrate; max daily dose 50u   • levothyroxine (SYNTHROID, LEVOTHROID) 150 mcg, Oral, Daily   • lisinopril (PRINIVIL,ZESTRIL) 10 mg, Oral, Daily      Return in about 3 months (around 3/23/2023) for Recheck with A1c, CMP, lipid, TSH, microalbumin, foot exam.    Albert Bell MD   12/23/2022

## 2022-12-23 NOTE — ASSESSMENT & PLAN NOTE
Diabetes is worsening.  A1c increased but okay at 6.5%.  Titrate up basal insulin.  Diabetes will be reassessed in 3 months.

## 2022-12-24 LAB — TSH SERPL DL<=0.05 MIU/L-ACNC: 0.29 UIU/ML (ref 0.27–4.2)

## 2023-03-15 RX ORDER — BLOOD SUGAR DIAGNOSTIC
STRIP MISCELLANEOUS
Qty: 150 EACH | Refills: 11 | Status: SHIPPED | OUTPATIENT
Start: 2023-03-15

## 2023-03-15 RX ORDER — LISINOPRIL 10 MG/1
10 TABLET ORAL DAILY
Qty: 90 TABLET | Refills: 1 | Status: SHIPPED | OUTPATIENT
Start: 2023-03-15

## 2023-03-15 RX ORDER — LEVOTHYROXINE SODIUM 0.15 MG/1
150 TABLET ORAL DAILY
Qty: 90 TABLET | Refills: 1 | Status: SHIPPED | OUTPATIENT
Start: 2023-03-15

## 2023-03-15 RX ORDER — INSULIN GLARGINE 100 [IU]/ML
13 INJECTION, SOLUTION SUBCUTANEOUS DAILY
Qty: 15 ML | Refills: 1 | Status: SHIPPED | OUTPATIENT
Start: 2023-03-15

## 2023-05-02 ENCOUNTER — OFFICE VISIT (OUTPATIENT)
Dept: ENDOCRINOLOGY | Facility: CLINIC | Age: 53
End: 2023-05-02
Payer: COMMERCIAL

## 2023-05-02 VITALS
HEART RATE: 72 BPM | HEIGHT: 72 IN | SYSTOLIC BLOOD PRESSURE: 108 MMHG | BODY MASS INDEX: 22.62 KG/M2 | WEIGHT: 167 LBS | OXYGEN SATURATION: 98 % | DIASTOLIC BLOOD PRESSURE: 68 MMHG

## 2023-05-02 DIAGNOSIS — I10 BENIGN HYPERTENSION: ICD-10-CM

## 2023-05-02 DIAGNOSIS — E10.9 TYPE 1 DIABETES MELLITUS WITHOUT COMPLICATION: Primary | ICD-10-CM

## 2023-05-02 DIAGNOSIS — E03.9 ACQUIRED HYPOTHYROIDISM: ICD-10-CM

## 2023-05-02 LAB
ALBUMIN SERPL-MCNC: 4.1 G/DL (ref 3.5–5.2)
ALBUMIN UR-MCNC: <1.2 MG/DL
ALBUMIN/GLOB SERPL: 1.6 G/DL
ALP SERPL-CCNC: 71 U/L (ref 39–117)
ALT SERPL W P-5'-P-CCNC: 14 U/L (ref 1–41)
ANION GAP SERPL CALCULATED.3IONS-SCNC: 7.8 MMOL/L (ref 5–15)
AST SERPL-CCNC: 16 U/L (ref 1–40)
BILIRUB SERPL-MCNC: 0.5 MG/DL (ref 0–1.2)
BUN SERPL-MCNC: 14 MG/DL (ref 6–20)
BUN/CREAT SERPL: 12.8 (ref 7–25)
CALCIUM SPEC-SCNC: 9.4 MG/DL (ref 8.6–10.5)
CHLORIDE SERPL-SCNC: 104 MMOL/L (ref 98–107)
CHOLEST SERPL-MCNC: 118 MG/DL (ref 0–200)
CO2 SERPL-SCNC: 27.2 MMOL/L (ref 22–29)
CREAT SERPL-MCNC: 1.09 MG/DL (ref 0.76–1.27)
CREAT UR-MCNC: 138.2 MG/DL
EGFRCR SERPLBLD CKD-EPI 2021: 82.2 ML/MIN/1.73
EXPIRATION DATE: ABNORMAL
EXPIRATION DATE: NORMAL
GLOBULIN UR ELPH-MCNC: 2.6 GM/DL
GLUCOSE BLDC GLUCOMTR-MCNC: 145 MG/DL (ref 70–130)
GLUCOSE SERPL-MCNC: 123 MG/DL (ref 65–99)
HBA1C MFR BLD: 6.3 %
HDLC SERPL-MCNC: 51 MG/DL (ref 40–60)
LDLC SERPL CALC-MCNC: 58 MG/DL (ref 0–100)
LDLC/HDLC SERPL: 1.2 {RATIO}
Lab: ABNORMAL
Lab: NORMAL
MICROALBUMIN/CREAT UR: NORMAL MG/G{CREAT}
POTASSIUM SERPL-SCNC: 4.4 MMOL/L (ref 3.5–5.2)
PROT SERPL-MCNC: 6.7 G/DL (ref 6–8.5)
SODIUM SERPL-SCNC: 139 MMOL/L (ref 136–145)
TRIGL SERPL-MCNC: 28 MG/DL (ref 0–150)
TSH SERPL DL<=0.05 MIU/L-ACNC: 0.43 UIU/ML (ref 0.27–4.2)
VLDLC SERPL-MCNC: 9 MG/DL (ref 5–40)

## 2023-05-02 PROCEDURE — 80053 COMPREHEN METABOLIC PANEL: CPT | Performed by: INTERNAL MEDICINE

## 2023-05-02 PROCEDURE — 82570 ASSAY OF URINE CREATININE: CPT | Performed by: INTERNAL MEDICINE

## 2023-05-02 PROCEDURE — 84443 ASSAY THYROID STIM HORMONE: CPT | Performed by: INTERNAL MEDICINE

## 2023-05-02 PROCEDURE — 80061 LIPID PANEL: CPT | Performed by: INTERNAL MEDICINE

## 2023-05-02 PROCEDURE — 82043 UR ALBUMIN QUANTITATIVE: CPT | Performed by: INTERNAL MEDICINE

## 2023-05-02 NOTE — PROGRESS NOTES
"     Office Note      Date: 2023  Patient Name: Nick Munguia  MRN: [id number]  : 1970    Chief Complaint   Patient presents with   • Diabetes     Type I       History of Present Illness:   Nick Munguia is a 52 y.o. male who presents for Diabetes type 1. Diagnosed in: . Treated in past with insulin. Current treatments: basal bolus insulin. Number of insulin shots per day: 4. Checks blood sugar 4 times a day. Has low blood sugar: rare. Aspirin use: Yes. Statin use: No -  . ACE-I/ARB use: Yes. Changes in health since last visit: none. Last eye exam 2021.     He remains on T4 150mcg qd. He is taking this correctly. He isn't taking any interfering meds concurrently. He hasn't noted any change in the size of his neck. He denies any compressive sxs. He denies any sxs of hypo- or hyperthyroidism at this time.    Subjective      Diabetic Complications:  Eyes: No  Kidneys: No  Feet: No  Heart: No    Diet and Exercise:  Meals per day: 3  Minutes of exercise per week: 250 mins.    Review of Systems:   Review of Systems   Constitutional: Negative.    Cardiovascular: Negative.    Gastrointestinal: Negative.    Endocrine: Negative.        The following portions of the patient's history were reviewed and updated as appropriate: allergies, current medications, past family history, past medical history, past social history, past surgical history and problem list.    Objective       Visit Vitals  /68 (BP Location: Left arm, Patient Position: Sitting, Cuff Size: Adult)   Pulse 72   Ht 182.9 cm (72\")   Wt 75.8 kg (167 lb)   SpO2 98%   BMI 22.65 kg/m²       Physical Exam:  Physical Exam  Constitutional:       Appearance: Normal appearance.   Neck:      Thyroid: No thyroid mass, thyromegaly or thyroid tenderness.   Cardiovascular:      Pulses:           Dorsalis pedis pulses are 2+ on the right side and 2+ on the left side.        Posterior tibial pulses are 2+ on the right side and 2+ on the left side.   Feet: "      Right foot:      Protective Sensation: 5 sites tested. 5 sites sensed.      Skin integrity: Skin integrity normal.      Toenail Condition: Right toenails are normal.      Left foot:      Protective Sensation: 5 sites tested. 5 sites sensed.      Skin integrity: Skin integrity normal.      Toenail Condition: Left toenails are normal.   Lymphadenopathy:      Cervical: No cervical adenopathy.   Neurological:      Mental Status: He is alert.         Labs:    HbA1c  Lab Results   Component Value Date    HGBA1C 6.3 05/02/2023       CMP  Lab Results   Component Value Date    GLUCOSE 142 (H) 03/07/2022    BUN 14 03/07/2022    CREATININE 1.01 03/07/2022    EGFRIFNONA 78 04/13/2021    BCR 13.9 03/07/2022    K 4.4 03/07/2022    CO2 29.4 (H) 03/07/2022    CALCIUM 9.2 03/07/2022    AST 17 03/07/2022    ALT 16 03/07/2022        Lipid Panel  Lab Results   Component Value Date    HDL 50 03/07/2022    LDL 61 03/07/2022    TRIG 32 03/07/2022        TSH  Lab Results   Component Value Date    TSH 0.295 12/23/2022        Hemoglobin A1C  Lab Results   Component Value Date    HGBA1C 6.3 05/02/2023        Microalbumin/Creatinine  Lab Results   Component Value Date    MALBCRERATIO  03/07/2022      Comment:      Unable to calculate    MICROALBUR <1.2 03/07/2022           Assessment / Plan      Assessment & Plan:  Diagnoses and all orders for this visit:    1. Type 1 diabetes mellitus without complication (Primary)  Assessment & Plan:  Diabetes is unchanged.   Continue current treatment regimen.  Diabetes will be reassessed in 3 months.    Orders:  -     POC Glucose, Blood  -     POC Glycosylated Hemoglobin (Hb A1C)  -     Comprehensive Metabolic Panel; Future  -     Lipid Panel; Future  -     Microalbumin / Creatinine Urine Ratio - Urine, Clean Catch; Future    2. Benign hypertension  Assessment & Plan:  Hypertension is unchanged.  Continue current treatment regimen.  Blood pressure will be reassessed at the next regular  appointment.      3. Acquired hypothyroidism  Assessment & Plan:  Continue T4 tx.  Check TSH.    Orders:  -     TSH; Future    Current Outpatient Medications   Medication Instructions   • Accu-Chek Estefany Plus test strip Test 4 times daily   • aspirin 81 mg, Oral, Daily   • B-D ULTRAFINE III SHORT PEN 31G X 8 MM misc USE 4 TIMES DAILY WITH INJECTIONS   • BASAGLAR KWIKPEN 13 Units, Subcutaneous, Daily   • insulin aspart (novoLOG FLEXPEN) 100 UNIT/ML solution pen-injector sc pen 1 unit per 20g carboydrate; max daily dose 50u   • levothyroxine (SYNTHROID, LEVOTHROID) 150 mcg, Oral, Daily   • lisinopril (PRINIVIL,ZESTRIL) 10 mg, Oral, Daily      Return in about 3 months (around 8/2/2023) for Recheck with A1c.    Albert Bell MD   05/02/2023

## 2023-08-24 RX ORDER — PEN NEEDLE, DIABETIC 31 GX5/16"
NEEDLE, DISPOSABLE MISCELLANEOUS
Qty: 400 EACH | Refills: 3 | Status: SHIPPED | OUTPATIENT
Start: 2023-08-24

## 2023-08-24 RX ORDER — INSULIN GLARGINE 100 [IU]/ML
16 INJECTION, SOLUTION SUBCUTANEOUS DAILY
Qty: 15 ML | Refills: 1 | Status: SHIPPED | OUTPATIENT
Start: 2023-08-24

## 2023-08-24 NOTE — TELEPHONE ENCOUNTER
Rx Refill Note  Requested Prescriptions      No prescriptions requested or ordered in this encounter        Last office visit with prescribing clinician: 5/2/2023      Next office visit with prescribing clinician: 9/22/2023       Ofelia Mcknight (Jodi)  08/24/23, 15:14 EDT

## 2023-10-17 RX ORDER — LEVOTHYROXINE SODIUM 0.15 MG/1
150 TABLET ORAL DAILY
Qty: 90 TABLET | Refills: 1 | Status: SHIPPED | OUTPATIENT
Start: 2023-10-17

## 2023-10-17 RX ORDER — LISINOPRIL 10 MG/1
10 TABLET ORAL DAILY
Qty: 90 TABLET | Refills: 1 | Status: SHIPPED | OUTPATIENT
Start: 2023-10-17

## 2023-10-17 NOTE — TELEPHONE ENCOUNTER
Rx Refill Note  Requested Prescriptions      No prescriptions requested or ordered in this encounter        Last office visit with prescribing clinician: 5/2/2023      Next office visit with prescribing clinician: 3/20/2024       Ofelia Mcknight (Jodi)  10/17/23, 14:25 EDT

## 2023-10-17 NOTE — TELEPHONE ENCOUNTER
Rx Refill Note  Requested Prescriptions      No prescriptions requested or ordered in this encounter        Last office visit with prescribing clinician: 5/2/2023      Next office visit with prescribing clinician: 3/20/2024       Ofelia Mcknight (Jodi)  10/17/23, 15:50 EDT

## 2023-10-17 NOTE — TELEPHONE ENCOUNTER
I refilled these but he is really going too long between visits (from may 23 to March 24)  Can we look for a sooner appt with Dr Bell?  Thanks, [facility]    Yes

## 2023-11-07 ENCOUNTER — OFFICE VISIT (OUTPATIENT)
Dept: ENDOCRINOLOGY | Facility: CLINIC | Age: 53
End: 2023-11-07
Payer: COMMERCIAL

## 2023-11-07 VITALS
BODY MASS INDEX: 22.62 KG/M2 | OXYGEN SATURATION: 98 % | WEIGHT: 167 LBS | HEART RATE: 80 BPM | HEIGHT: 72 IN | SYSTOLIC BLOOD PRESSURE: 106 MMHG | DIASTOLIC BLOOD PRESSURE: 64 MMHG

## 2023-11-07 DIAGNOSIS — I10 BENIGN HYPERTENSION: ICD-10-CM

## 2023-11-07 DIAGNOSIS — E10.9 TYPE 1 DIABETES MELLITUS WITHOUT COMPLICATION: Primary | ICD-10-CM

## 2023-11-07 DIAGNOSIS — E03.9 ACQUIRED HYPOTHYROIDISM: ICD-10-CM

## 2023-11-07 LAB
EXPIRATION DATE: ABNORMAL
EXPIRATION DATE: NORMAL
GLUCOSE BLDC GLUCOMTR-MCNC: 116 MG/DL (ref 70–130)
HBA1C MFR BLD: 6.6 % (ref 4.5–5.7)
Lab: ABNORMAL
Lab: NORMAL

## 2023-11-07 PROCEDURE — 82947 ASSAY GLUCOSE BLOOD QUANT: CPT | Performed by: INTERNAL MEDICINE

## 2023-11-07 PROCEDURE — 99214 OFFICE O/P EST MOD 30 MIN: CPT | Performed by: INTERNAL MEDICINE

## 2023-11-07 PROCEDURE — 83036 HEMOGLOBIN GLYCOSYLATED A1C: CPT | Performed by: INTERNAL MEDICINE

## 2023-11-07 RX ORDER — LEVOTHYROXINE SODIUM 0.15 MG/1
150 TABLET ORAL DAILY
Qty: 90 TABLET | Refills: 3 | Status: SHIPPED | OUTPATIENT
Start: 2023-11-07

## 2023-11-07 RX ORDER — LISINOPRIL 10 MG/1
10 TABLET ORAL DAILY
Qty: 90 TABLET | Refills: 3 | Status: SHIPPED | OUTPATIENT
Start: 2023-11-07

## 2023-11-07 RX ORDER — INSULIN GLARGINE 100 [IU]/ML
16 INJECTION, SOLUTION SUBCUTANEOUS DAILY
Qty: 15 ML | Refills: 3 | Status: SHIPPED | OUTPATIENT
Start: 2023-11-07

## 2023-11-07 NOTE — PROGRESS NOTES
"     Office Note      Date: 2023  Patient Name: Nick Munguia  MRN: [id number]  : 1970    Chief Complaint   Patient presents with    Diabetes       History of Present Illness:   Nick Munguia is a 52 y.o. male who presents for Diabetes type 1. Diagnosed in: . Treated in past with insulin. Current treatments: basal bolus insulin. Number of insulin shots per day: 4. Checks blood sugar 4 times a day. Has low blood sugar: rare. Aspirin use: Yes. Statin use: No. ACE-I/ARB use: Yes. Changes in health since last visit: none. Last eye exam 2021.     He remains on T4 150mcg qd. He is taking this correctly. He isn't taking any interfering meds concurrently. He hasn't noted any change in the size of his neck. He denies any compressive sxs. He denies any sxs of hypo- or hyperthyroidism at this time.    Subjective      Diabetic Complications:  Eyes: No  Kidneys: No  Feet: No  Heart: No    Diet and Exercise:  Meals per day: 3  Minutes of exercise per week: 250 mins.    Review of Systems:   Review of Systems   Constitutional: Negative.    Cardiovascular: Negative.    Gastrointestinal: Negative.    Endocrine: Negative.        The following portions of the patient's history were reviewed and updated as appropriate: allergies, current medications, past family history, past medical history, past social history, past surgical history, and problem list.    Objective     Visit Vitals  /64   Pulse 80   Ht 182.9 cm (72\")   Wt 75.8 kg (167 lb)   SpO2 98%   BMI 22.65 kg/m²       Physical Exam:  Physical Exam  Constitutional:       Appearance: Normal appearance.   Neurological:      Mental Status: He is alert.         Labs:    HbA1c  Lab Results   Component Value Date    HGBA1C 6.6 (A) 2023       CMP  Lab Results   Component Value Date    GLUCOSE 123 (H) 2023    BUN 14 2023    CREATININE 1.09 2023    EGFRIFNONA 78 2021    BCR 12.8 2023    K 4.4 2023    CO2 27.2 2023    " CALCIUM 9.4 05/02/2023    AST 16 05/02/2023    ALT 14 05/02/2023        Lipid Panel  Lab Results   Component Value Date    HDL 51 05/02/2023    LDL 58 05/02/2023    TRIG 28 05/02/2023        TSH  Lab Results   Component Value Date    TSH 0.429 05/02/2023        Hemoglobin A1C  Lab Results   Component Value Date    HGBA1C 6.6 (A) 11/07/2023        Microalbumin/Creatinine  Lab Results   Component Value Date    MALBCRERATIO  05/02/2023      Comment:      Unable to calculate    MICROALBUR <1.2 05/02/2023           Assessment / Plan      Assessment & Plan:  Diagnoses and all orders for this visit:    1. Type 1 diabetes mellitus without complication (Primary)  Assessment & Plan:  Diabetes is unchanged.   Continue current treatment regimen.  Diabetes will be reassessed in 3 months.    Orders:  -     POC Glucose, Blood  -     POC Glycosylated Hemoglobin (Hb A1C)    2. Benign hypertension  Assessment & Plan:  Hypertension is unchanged.  Continue current treatment regimen.  Blood pressure will be reassessed at the next regular appointment.      3. Acquired hypothyroidism  Assessment & Plan:  Continue T4 tx.  Last TSH was okay.  Plan to recheck next visit.      Other orders  -     Insulin Glargine (BASAGLAR KWIKPEN) 100 UNIT/ML injection pen; Inject 16 Units under the skin into the appropriate area as directed Daily.  Dispense: 15 mL; Refill: 3  -     levothyroxine (SYNTHROID, LEVOTHROID) 150 MCG tablet; Take 1 tablet by mouth Daily.  Dispense: 90 tablet; Refill: 3  -     lisinopril (PRINIVIL,ZESTRIL) 10 MG tablet; Take 1 tablet by mouth Daily. Keep appt for refills  Dispense: 90 tablet; Refill: 3      Current Outpatient Medications   Medication Instructions    Accu-Chek Estefany Plus test strip Test 4 times daily    aspirin 81 mg, Oral, Daily    B-D ULTRAFINE III SHORT PEN 31G X 8 MM misc USE 4 TIMES DAILY WITH INJECTIONS    BASAGLAR KWIKPEN 16 Units, Subcutaneous, Daily    insulin aspart (novoLOG FLEXPEN) 100 UNIT/ML solution  pen-injector sc pen 1 unit per 20g carboydrate; max daily dose 50u KEEP APPT FOR REFILL    levothyroxine (SYNTHROID, LEVOTHROID) 150 mcg, Oral, Daily    lisinopril (PRINIVIL,ZESTRIL) 10 mg, Oral, Daily, Keep appt for refills      Return in about 3 months (around 2/7/2024) for Recheck with A1c, CMP, lipid, TSH, microalbumin, foot exam.    Albert Bell MD   11/07/2023

## 2024-08-20 RX ORDER — INSULIN GLARGINE 100 [IU]/ML
INJECTION, SOLUTION SUBCUTANEOUS
Qty: 15 ML | Refills: 1 | Status: SHIPPED | OUTPATIENT
Start: 2024-08-20

## 2024-09-20 RX ORDER — INSULIN ASPART 100 [IU]/ML
INJECTION, SOLUTION INTRAVENOUS; SUBCUTANEOUS
Qty: 45 ML | Refills: 3 | Status: SHIPPED | OUTPATIENT
Start: 2024-09-20

## 2024-10-01 ENCOUNTER — OFFICE VISIT (OUTPATIENT)
Dept: ENDOCRINOLOGY | Facility: CLINIC | Age: 54
End: 2024-10-01
Payer: COMMERCIAL

## 2024-10-01 VITALS
BODY MASS INDEX: 22.89 KG/M2 | HEIGHT: 72 IN | SYSTOLIC BLOOD PRESSURE: 112 MMHG | DIASTOLIC BLOOD PRESSURE: 64 MMHG | HEART RATE: 62 BPM | OXYGEN SATURATION: 100 % | WEIGHT: 169 LBS

## 2024-10-01 DIAGNOSIS — E03.9 ACQUIRED HYPOTHYROIDISM: ICD-10-CM

## 2024-10-01 DIAGNOSIS — E10.9 TYPE 1 DIABETES MELLITUS WITHOUT COMPLICATION: Primary | ICD-10-CM

## 2024-10-01 DIAGNOSIS — I10 BENIGN HYPERTENSION: ICD-10-CM

## 2024-10-01 LAB
ALBUMIN SERPL-MCNC: 4.1 G/DL (ref 3.5–5.2)
ALBUMIN/GLOB SERPL: 1.5 G/DL
ALP SERPL-CCNC: 75 U/L (ref 39–117)
ALT SERPL W P-5'-P-CCNC: 11 U/L (ref 1–41)
ANION GAP SERPL CALCULATED.3IONS-SCNC: 6.2 MMOL/L (ref 5–15)
AST SERPL-CCNC: 18 U/L (ref 1–40)
BILIRUB SERPL-MCNC: 0.5 MG/DL (ref 0–1.2)
BUN SERPL-MCNC: 10 MG/DL (ref 6–20)
BUN/CREAT SERPL: 10.2 (ref 7–25)
CALCIUM SPEC-SCNC: 9.6 MG/DL (ref 8.6–10.5)
CHLORIDE SERPL-SCNC: 103 MMOL/L (ref 98–107)
CHOLEST SERPL-MCNC: 115 MG/DL (ref 0–200)
CO2 SERPL-SCNC: 28.8 MMOL/L (ref 22–29)
CREAT SERPL-MCNC: 0.98 MG/DL (ref 0.76–1.27)
EGFRCR SERPLBLD CKD-EPI 2021: 92.8 ML/MIN/1.73
EXPIRATION DATE: ABNORMAL
EXPIRATION DATE: NORMAL
GLOBULIN UR ELPH-MCNC: 2.7 GM/DL
GLUCOSE BLDC GLUCOMTR-MCNC: 77 MG/DL (ref 70–130)
GLUCOSE SERPL-MCNC: 102 MG/DL (ref 65–99)
HBA1C MFR BLD: 6.2 % (ref 4.5–5.7)
HDLC SERPL-MCNC: 49 MG/DL (ref 40–60)
LDLC SERPL CALC-MCNC: 56 MG/DL (ref 0–100)
LDLC/HDLC SERPL: 1.2 {RATIO}
Lab: ABNORMAL
Lab: NORMAL
POTASSIUM SERPL-SCNC: 4.5 MMOL/L (ref 3.5–5.2)
PROT SERPL-MCNC: 6.8 G/DL (ref 6–8.5)
SODIUM SERPL-SCNC: 138 MMOL/L (ref 136–145)
TRIGL SERPL-MCNC: 37 MG/DL (ref 0–150)
TSH SERPL DL<=0.05 MIU/L-ACNC: 0.13 UIU/ML (ref 0.27–4.2)
VLDLC SERPL-MCNC: 10 MG/DL (ref 5–40)

## 2024-10-01 PROCEDURE — 99214 OFFICE O/P EST MOD 30 MIN: CPT | Performed by: INTERNAL MEDICINE

## 2024-10-01 PROCEDURE — 80053 COMPREHEN METABOLIC PANEL: CPT | Performed by: INTERNAL MEDICINE

## 2024-10-01 PROCEDURE — 83036 HEMOGLOBIN GLYCOSYLATED A1C: CPT | Performed by: INTERNAL MEDICINE

## 2024-10-01 PROCEDURE — 80061 LIPID PANEL: CPT | Performed by: INTERNAL MEDICINE

## 2024-10-01 PROCEDURE — 82570 ASSAY OF URINE CREATININE: CPT | Performed by: INTERNAL MEDICINE

## 2024-10-01 PROCEDURE — 82947 ASSAY GLUCOSE BLOOD QUANT: CPT | Performed by: INTERNAL MEDICINE

## 2024-10-01 PROCEDURE — 82043 UR ALBUMIN QUANTITATIVE: CPT | Performed by: INTERNAL MEDICINE

## 2024-10-01 PROCEDURE — 84443 ASSAY THYROID STIM HORMONE: CPT | Performed by: INTERNAL MEDICINE

## 2024-10-01 RX ORDER — BLOOD SUGAR DIAGNOSTIC
STRIP MISCELLANEOUS
Qty: 150 EACH | Refills: 11 | Status: SHIPPED | OUTPATIENT
Start: 2024-10-01

## 2024-10-01 RX ORDER — PEN NEEDLE, DIABETIC 31 GX5/16"
NEEDLE, DISPOSABLE MISCELLANEOUS
Qty: 400 EACH | Refills: 3 | Status: SHIPPED | OUTPATIENT
Start: 2024-10-01

## 2024-10-01 RX ORDER — INSULIN GLARGINE 100 [IU]/ML
17 INJECTION, SOLUTION SUBCUTANEOUS DAILY
Qty: 15 ML | Refills: 3 | Status: SHIPPED | OUTPATIENT
Start: 2024-10-01

## 2024-10-01 RX ORDER — INSULIN ASPART 100 [IU]/ML
INJECTION, SOLUTION INTRAVENOUS; SUBCUTANEOUS
Qty: 45 ML | Refills: 3 | Status: SHIPPED | OUTPATIENT
Start: 2024-10-01

## 2024-10-01 RX ORDER — LEVOTHYROXINE SODIUM 150 UG/1
150 TABLET ORAL DAILY
Qty: 90 TABLET | Refills: 3 | Status: SHIPPED | OUTPATIENT
Start: 2024-10-01

## 2024-10-01 RX ORDER — INSULIN GLARGINE 100 [IU]/ML
INJECTION, SOLUTION SUBCUTANEOUS
COMMUNITY
Start: 2024-08-19 | End: 2024-10-01 | Stop reason: SDUPTHER

## 2024-10-01 RX ORDER — LISINOPRIL 10 MG/1
10 TABLET ORAL DAILY
Qty: 90 TABLET | Refills: 3 | Status: SHIPPED | OUTPATIENT
Start: 2024-10-01

## 2024-10-01 NOTE — PROGRESS NOTES
"     Office Note      Date: 10/01/2024  Patient Name: Nick Munguia  MRN: [id number]  : 1970    Chief Complaint   Patient presents with    Diabetes     Type 1 diabetes mellitus without complication         History of Present Illness:   Nick Munguia is a 53 y.o. male who presents for Diabetes type 1. Diagnosed in: . Treated in past with insulin. Current treatments: basal bolus insulin. Number of insulin shots per day: 4. Checks blood sugar 4 times a day. Has low blood sugar: rare. Aspirin use: Yes. Statin use: No. ACE-I/ARB use: Yes. Changes in health since last visit: none. Last eye exam 2024.     He remains on T4 150mcg qd. He is taking this correctly. He isn't taking any interfering meds concurrently. He hasn't noted any change in the size of his neck. He denies any compressive sxs. He denies any sxs of hypo- or hyperthyroidism at this time.    Subjective      Diabetic Complications:  Eyes: No  Kidneys: No  Feet: No  Heart: No    Diet and Exercise:  Meals per day: 3  Minutes of exercise per week: 250 mins.    Review of Systems:   Review of Systems   Constitutional: Negative.    Cardiovascular: Negative.    Gastrointestinal: Negative.    Endocrine: Negative.        The following portions of the patient's history were reviewed and updated as appropriate: allergies, current medications, past family history, past medical history, past social history, past surgical history, and problem list.    Objective     Visit Vitals  /64 (BP Location: Right arm, Patient Position: Sitting, Cuff Size: Adult)   Pulse 62   Ht 182.9 cm (72\")   Wt 76.7 kg (169 lb)   SpO2 100%   BMI 22.92 kg/m²       Physical Exam:  Physical Exam  Constitutional:       Appearance: Normal appearance.   Neck:      Thyroid: No thyroid mass, thyromegaly or thyroid tenderness.   Cardiovascular:      Pulses:           Dorsalis pedis pulses are 2+ on the right side and 2+ on the left side.        Posterior tibial pulses are 2+ on the right " side and 2+ on the left side.   Feet:      Right foot:      Protective Sensation: 5 sites tested.  5 sites sensed.      Skin integrity: Skin integrity normal.      Toenail Condition: Right toenails are normal.      Left foot:      Protective Sensation: 5 sites tested.  5 sites sensed.      Skin integrity: Skin integrity normal.      Toenail Condition: Left toenails are normal.   Lymphadenopathy:      Cervical: No cervical adenopathy.   Neurological:      Mental Status: He is alert.         Labs:    HbA1c  Lab Results   Component Value Date    HGBA1C 6.2 (A) 10/01/2024       CMP  Lab Results   Component Value Date    GLUCOSE 123 (H) 05/02/2023    BUN 14 05/02/2023    CREATININE 1.09 05/02/2023    EGFRIFNONA 78 04/13/2021    BCR 12.8 05/02/2023    K 4.4 05/02/2023    CO2 27.2 05/02/2023    CALCIUM 9.4 05/02/2023    AST 16 05/02/2023    ALT 14 05/02/2023        Lipid Panel  Lab Results   Component Value Date    HDL 51 05/02/2023    LDL 58 05/02/2023    TRIG 28 05/02/2023        TSH  Lab Results   Component Value Date    TSH 0.429 05/02/2023        Hemoglobin A1C  Lab Results   Component Value Date    HGBA1C 6.2 (A) 10/01/2024        Microalbumin/Creatinine  Lab Results   Component Value Date    MALBCRERATIO  05/02/2023      Comment:      Unable to calculate    MICROALBUR <1.2 05/02/2023           Assessment / Plan      Assessment & Plan:  Diagnoses and all orders for this visit:    1. Type 1 diabetes mellitus without complication (Primary)  Assessment & Plan:  Diabetes is stable.   Continue current treatment regimen.  Diabetes will be reassessed in 6 months.    Orders:  -     POC Glycosylated Hemoglobin (Hb A1C)  -     POC Glucose, Blood  -     Comprehensive Metabolic Panel; Future  -     Lipid Panel; Future  -     Microalbumin / Creatinine Urine Ratio - Urine, Clean Catch; Future  -     TSH; Future    2. Benign hypertension  Assessment & Plan:  Hypertension is stable and controlled  Continue current treatment  regimen.  Blood pressure will be reassessed in 6 months.      3. Acquired hypothyroidism  Assessment & Plan:  Continue T4 tx.  Check TSH.      Other orders  -     levothyroxine (SYNTHROID, LEVOTHROID) 150 MCG tablet; Take 1 tablet by mouth Daily.  Dispense: 90 tablet; Refill: 3  -     lisinopril (PRINIVIL,ZESTRIL) 10 MG tablet; Take 1 tablet by mouth Daily. Keep appt for refills  Dispense: 90 tablet; Refill: 3  -     Lantus SoloStar 100 UNIT/ML injection pen; Inject 17 Units under the skin into the appropriate area as directed Daily.  Dispense: 15 mL; Refill: 3  -     insulin aspart (NovoLOG FlexPen) 100 UNIT/ML solution pen-injector sc pen; 1 UNIT PER 20 GRAMS CHO.{MAX DAILY DOSE OF 50 UNITS)  Dispense: 45 mL; Refill: 3  -     B-D ULTRAFINE III SHORT PEN 31G X 8 MM misc; USE 4 TIMES DAILY WITH INJECTIONS  Dispense: 400 each; Refill: 3  -     Accu-Chek Estefany Plus test strip; Test 4 times daily  Dispense: 150 each; Refill: 11      Current Outpatient Medications   Medication Instructions    Accu-Chek Estefany Plus test strip Test 4 times daily    aspirin 81 mg, Oral, Daily    B-D ULTRAFINE III SHORT PEN 31G X 8 MM misc USE 4 TIMES DAILY WITH INJECTIONS    insulin aspart (NovoLOG FlexPen) 100 UNIT/ML solution pen-injector sc pen 1 UNIT PER 20 GRAMS CHO.{MAX DAILY DOSE OF 50 UNITS)    Lantus SoloStar 17 Units, Subcutaneous, Daily    levothyroxine (SYNTHROID, LEVOTHROID) 150 mcg, Oral, Daily    lisinopril (PRINIVIL,ZESTRIL) 10 mg, Oral, Daily, Keep appt for refills      Return in about 6 months (around 4/1/2025) for Recheck with A1c, TSH.    Electronically signed by: Albert Bell MD  10/01/2024

## 2024-10-02 LAB
ALBUMIN UR-MCNC: <1.2 MG/DL
CREAT UR-MCNC: 83.5 MG/DL
MICROALBUMIN/CREAT UR: NORMAL MG/G{CREAT}

## 2025-04-16 ENCOUNTER — RESULTS FOLLOW-UP (OUTPATIENT)
Dept: ENDOCRINOLOGY | Facility: CLINIC | Age: 55
End: 2025-04-16

## 2025-04-16 ENCOUNTER — OFFICE VISIT (OUTPATIENT)
Dept: ENDOCRINOLOGY | Facility: CLINIC | Age: 55
End: 2025-04-16
Payer: COMMERCIAL

## 2025-04-16 VITALS
BODY MASS INDEX: 23.16 KG/M2 | OXYGEN SATURATION: 99 % | HEIGHT: 72 IN | WEIGHT: 171 LBS | HEART RATE: 65 BPM | SYSTOLIC BLOOD PRESSURE: 104 MMHG | DIASTOLIC BLOOD PRESSURE: 62 MMHG

## 2025-04-16 DIAGNOSIS — I10 BENIGN HYPERTENSION: ICD-10-CM

## 2025-04-16 DIAGNOSIS — E03.9 ACQUIRED HYPOTHYROIDISM: ICD-10-CM

## 2025-04-16 DIAGNOSIS — E10.9 TYPE 1 DIABETES MELLITUS WITHOUT COMPLICATION: Primary | ICD-10-CM

## 2025-04-16 LAB
EXPIRATION DATE: ABNORMAL
EXPIRATION DATE: ABNORMAL
GLUCOSE BLDC GLUCOMTR-MCNC: 135 MG/DL (ref 70–130)
HBA1C MFR BLD: 6.4 % (ref 4.5–5.7)
Lab: ABNORMAL
Lab: ABNORMAL
TSH SERPL DL<=0.05 MIU/L-ACNC: 0.55 UIU/ML (ref 0.27–4.2)

## 2025-04-16 PROCEDURE — 84443 ASSAY THYROID STIM HORMONE: CPT | Performed by: INTERNAL MEDICINE

## 2025-04-16 RX ORDER — INSULIN GLARGINE 100 [IU]/ML
18 INJECTION, SOLUTION SUBCUTANEOUS DAILY
Qty: 27 ML | Refills: 3 | Status: SHIPPED | OUTPATIENT
Start: 2025-04-16

## 2025-04-16 NOTE — ASSESSMENT & PLAN NOTE
Diabetes is stable.   Continue current treatment regimen.  Diabetes will be reassessed in 6 months.    We discussed CGM today.  Provided sample of Cristobal 3+ for him to try.  He will let us know if he wants us to send Rx for this.

## 2025-04-16 NOTE — PROGRESS NOTES
"     Office Note      Date: 2025  Patient Name: Nick Munguia  MRN: [id number]  : 1970    Chief Complaint   Patient presents with    Diabetes     Type 1 diabetes mellitus without complication    Hypothyroidism    Hypertension       History of Present Illness:   Nick Munguia is a 54 y.o. male who presents for Diabetes type 1. Diagnosed in: . Treated in past with insulin. Current treatments: basal bolus insulin. Number of insulin shots per day: 4. Checks blood sugar 4 times a day. Has low blood sugar: rare. Aspirin use: Yes. Statin use: No. ACE-I/ARB use: Yes. Changes in health since last visit: none. Last eye exam 2024.     He remains on T4 150mcg qd. He is taking this correctly. He isn't taking any interfering meds concurrently. He hasn't noted any change in the size of his neck. He denies any compressive sxs. He denies any sxs of hypo- or hyperthyroidism at this time.    Subjective      Diabetic Complications:  Eyes: No  Kidneys: No  Feet: No  Heart: No    Diet and Exercise:  Meals per day: 3  Minutes of exercise per week: 250 mins.    Review of Systems:   Review of Systems   Constitutional: Negative.    Cardiovascular: Negative.    Gastrointestinal: Negative.    Endocrine: Negative.        The following portions of the patient's history were reviewed and updated as appropriate: allergies, current medications, past family history, past medical history, past social history, past surgical history, and problem list.    Objective     Visit Vitals  /62 (BP Location: Left arm, Patient Position: Sitting, Cuff Size: Adult)   Pulse 65   Ht 182.9 cm (72\")   Wt 77.6 kg (171 lb)   SpO2 99%   BMI 23.19 kg/m²       Physical Exam:  Physical Exam  Constitutional:       Appearance: Normal appearance.   Neurological:      Mental Status: He is alert.         Labs:    HbA1c  Lab Results   Component Value Date    HGBA1C 6.4 (A) 2025       CMP  Lab Results   Component Value Date    GLUCOSE 102 (H) " "10/01/2024    BUN 10 10/01/2024    CREATININE 0.98 10/01/2024    EGFRIFNONA 78 04/13/2021    BCR 10.2 10/01/2024    K 4.5 10/01/2024    CO2 28.8 10/01/2024    CALCIUM 9.6 10/01/2024    AST 18 10/01/2024    ALT 11 10/01/2024        Lipid Panel  Lab Results   Component Value Date    HDL Cholesterol 49 10/01/2024    LDL Cholesterol  56 10/01/2024    LDL/HDL Ratio 1.20 10/01/2024    Triglycerides 37 10/01/2024        TSH  Lab Results   Component Value Date    TSH 0.130 (L) 10/01/2024        Hemoglobin A1C  No components found for: \"HGBA1C\"     Microalbumin/Creatinine  Lab Results   Component Value Date    MALBCRERATIO  10/01/2024      Comment:      Unable to calculate    MICROALBUR <1.2 10/01/2024           Assessment / Plan      Assessment & Plan:  Diagnoses and all orders for this visit:    1. Type 1 diabetes mellitus without complication (Primary)  Assessment & Plan:  Diabetes is stable.   Continue current treatment regimen.  Diabetes will be reassessed in 6 months.    We discussed CGM today.  Provided sample of Cristobal 3+ for him to try.  He will let us know if he wants us to send Rx for this.    Orders:  -     POC Glucose, Blood  -     POC Glycosylated Hemoglobin (Hb A1C)    2. Benign hypertension  Assessment & Plan:  Hypertension is stable and controlled.  Continue current treatment regimen.  Blood pressure will be reassessed in 6 months.      3. Acquired hypothyroidism  Assessment & Plan:  Continue levothyroxine.  Check TSH.    Orders:  -     TSH; Future    Other orders  -     Lantus SoloStar 100 UNIT/ML injection pen; Inject 18 Units under the skin into the appropriate area as directed Daily. Titrate to fasting glucose <120; max daily dose 30u  Dispense: 27 mL; Refill: 3      Current Outpatient Medications   Medication Instructions    Accu-Chek Estefany Plus test strip Test 4 times daily    aspirin 81 mg, Daily    B-D ULTRAFINE III SHORT PEN 31G X 8 MM misc USE 4 TIMES DAILY WITH INJECTIONS    insulin aspart (NovoLOG " FlexPen) 100 UNIT/ML solution pen-injector sc pen 1 UNIT PER 20 GRAMS CHO.{MAX DAILY DOSE OF 50 UNITS)    Lantus SoloStar 18 Units, Subcutaneous, Daily, Titrate to fasting glucose <120; max daily dose 30u    levothyroxine (SYNTHROID, LEVOTHROID) 150 mcg, Oral, Daily    lisinopril (PRINIVIL,ZESTRIL) 10 mg, Oral, Daily, Keep appt for refills      Return in about 6 months (around 10/16/2025) for Recheck with A1c, CMP, lipid, TSH, microalbumin, foot exam.    Electronically signed by: Albert Bell MD  04/16/2025